# Patient Record
Sex: FEMALE | Race: WHITE | NOT HISPANIC OR LATINO
[De-identification: names, ages, dates, MRNs, and addresses within clinical notes are randomized per-mention and may not be internally consistent; named-entity substitution may affect disease eponyms.]

---

## 2023-12-29 ENCOUNTER — APPOINTMENT (OUTPATIENT)
Dept: OTOLARYNGOLOGY | Facility: CLINIC | Age: 28
End: 2023-12-29
Payer: COMMERCIAL

## 2023-12-29 VITALS — WEIGHT: 125 LBS | HEIGHT: 63 IN | BODY MASS INDEX: 22.15 KG/M2

## 2023-12-29 DIAGNOSIS — Z78.9 OTHER SPECIFIED HEALTH STATUS: ICD-10-CM

## 2023-12-29 DIAGNOSIS — Z82.0 FAMILY HISTORY OF EPILEPSY AND OTHER DISEASES OF THE NERVOUS SYSTEM: ICD-10-CM

## 2023-12-29 PROBLEM — Z00.00 ENCOUNTER FOR PREVENTIVE HEALTH EXAMINATION: Status: ACTIVE | Noted: 2023-12-29

## 2023-12-29 PROCEDURE — 99203 OFFICE O/P NEW LOW 30 MIN: CPT

## 2023-12-29 RX ORDER — BUPROPION HYDROCHLORIDE 300 MG/1
300 TABLET, EXTENDED RELEASE ORAL
Refills: 0 | Status: ACTIVE | COMMUNITY

## 2023-12-29 RX ORDER — DEXTROAMPHETAMINE SULFATE 10 MG/1
10 TABLET ORAL
Refills: 0 | Status: ACTIVE | COMMUNITY

## 2023-12-29 NOTE — PHYSICAL EXAM
[TextEntry] : PHYSICAL EXAM  General: The patient was alert, oriented and in no distress. Voice was clear.  Face: The patient had no facial asymmetry or mass. The skin was unremarkable.  Ears: Hearing normal to conversational voice External ears were normal without deformity. Ear canals were clear. No cerumen or inflammation Tympanic membranes were intact and normal. No perforation or effusion. mobile  Nose:  The external nose had no significant deformity.     On anterior rhinoscopy, the nasal mucosa was clear.   The anterior septum was grossly midline. There were no visualized polyps, purulence  or masses.   Oral cavity: Oral mucosa- normal. Oral and base of tongue- clear and without mass. Gingival and buccal mucosa- moist and without lesions. Palate- the palate moved well. There was no cleft palate. There appeared to be good salivary flow.   Oral cavity/oropharynx- no pus, erythema or mass 2-3+ cryptic tonsils.  Culture taken  Neck:  The neck was symmetrical. The parotid and submandibular glands were normal without masses. The trachea was midline and there was no unusual crepitus. Thyroid was smooth and nontender and no masses were palpated. No masses  Lymphatics: Cervical adenopathy- none.

## 2023-12-29 NOTE — HISTORY OF PRESENT ILLNESS
[de-identified] : Ayanna Herron is a 28 year old patient Here for evaluation for recurrent tonsillitis.  She said that since February, she has had 1-2 episodes a month where she developed sore throat and fever.  The fever could be as high as 103.  She may also occasionally have lesions or ulcers on her tonsils.  She has been to urgent care.  She said testing for the flu, COVID, and strep is typically negative.  She has been placed on antibiotics once or twice without much improvement.  She does not have a history of recurrent tonsillitis prior to this.  She does not have a known history of reflux.  She does not smoke tobacco but does smoke marijuana.  Her last episode was last month.

## 2023-12-29 NOTE — ASSESSMENT
[FreeTextEntry1] : She has a history of recurrent tonsillitis since last February.  Her last episode was last month.  Cultures have been negative.  On exam she does have 2-3+ tonsils.  A culture was taken.  Flexible laryngoscopy was unremarkable  Plan -Findings and management options were discussed with the patient. -Salt water gargles as needed -I have asked her to call for the culture results -We discussed management options.  Because she has not had positive cultures, I am recommending ID evaluation prior to considering tonsillectomy. -I would like to see her when she has an infection -I will see her back after the ID evaluation

## 2024-01-02 LAB — BACTERIA THROAT CULT: NORMAL

## 2024-01-30 ENCOUNTER — APPOINTMENT (OUTPATIENT)
Dept: OTOLARYNGOLOGY | Facility: CLINIC | Age: 29
End: 2024-01-30
Payer: COMMERCIAL

## 2024-01-30 DIAGNOSIS — R07.0 PAIN IN THROAT: ICD-10-CM

## 2024-01-30 PROCEDURE — 99213 OFFICE O/P EST LOW 20 MIN: CPT

## 2024-01-30 NOTE — HISTORY OF PRESENT ILLNESS
[de-identified] : Ayanna Herron is a 29 year old patient Here for possible tonsillitis.  She said she had a sore throat for 2 days.  Last night she had worsening pain and a fever to 102.  The fever has resolved.  She still has throat discomfort.  ENT history Since February 2023 she has had 1-2 episodes a month of sore throat and fever. She occasionally has ulcers or lesions on her tonsils Testing for strep is typically negative Antibiotics did not help No history of reflux She smokes marijuana She does not smoke tobacco Flexible laryngoscopy was unremarkable 12/29/23-culture negative

## 2024-01-30 NOTE — ASSESSMENT
[FreeTextEntry1] : She has a history of recurrent tonsillitis/sore throats.  She developed symptoms 2 days ago.  She had very mild erythema on exam today.  There is no abscess or lesions.  A culture was taken to rule out bacterial infection  Plan -Findings and management options were discussed with the patient. -Continue salt water gargles -I asked to call for the culture results.  I will place him on antibiotics-if the culture is positive. -We had discussed ID evaluation prior to considering tonsillectomy.  I recommended she proceed with it -Smoking cessation recommended -I will see how she is feeling when she calls for the culture results.  Otherwise, I will see her back after the ID evaluation

## 2024-02-02 LAB — BACTERIA THROAT CULT: NORMAL

## 2024-04-02 ENCOUNTER — APPOINTMENT (OUTPATIENT)
Dept: INTERNAL MEDICINE | Facility: CLINIC | Age: 29
End: 2024-04-02
Payer: COMMERCIAL

## 2024-04-02 VITALS
WEIGHT: 132.13 LBS | DIASTOLIC BLOOD PRESSURE: 79 MMHG | HEART RATE: 115 BPM | SYSTOLIC BLOOD PRESSURE: 124 MMHG | OXYGEN SATURATION: 99 % | TEMPERATURE: 98 F | BODY MASS INDEX: 23.41 KG/M2 | HEIGHT: 63 IN

## 2024-04-02 DIAGNOSIS — F32.A DEPRESSION, UNSPECIFIED: ICD-10-CM

## 2024-04-02 DIAGNOSIS — Z80.3 FAMILY HISTORY OF MALIGNANT NEOPLASM OF BREAST: ICD-10-CM

## 2024-04-02 DIAGNOSIS — Z82.49 FAMILY HISTORY OF ISCHEMIC HEART DISEASE AND OTHER DISEASES OF THE CIRCULATORY SYSTEM: ICD-10-CM

## 2024-04-02 DIAGNOSIS — F90.9 ATTENTION-DEFICIT HYPERACTIVITY DISORDER, UNSPECIFIED TYPE: ICD-10-CM

## 2024-04-02 PROCEDURE — G2211 COMPLEX E/M VISIT ADD ON: CPT

## 2024-04-02 PROCEDURE — 99204 OFFICE O/P NEW MOD 45 MIN: CPT

## 2024-04-02 PROCEDURE — 36415 COLL VENOUS BLD VENIPUNCTURE: CPT

## 2024-04-02 PROCEDURE — G0444 DEPRESSION SCREEN ANNUAL: CPT | Mod: 59

## 2024-04-02 NOTE — HEALTH RISK ASSESSMENT
[2 - 4 times a month (2 pts)] : 2-4 times a month (2 points) [1 or 2 (0 pts)] : 1 or 2 (0 points) [Never (0 pts)] : Never (0 points) [Yes] : In the past 12 months have you used drugs other than those required for medical reasons? Yes [0] : 1) Little interest or pleasure doing things: Not at all (0) [PHQ-2 Negative - No further assessment needed] : PHQ-2 Negative - No further assessment needed [# of Members in Household ___] :  household currently consist of [unfilled] member(s) [Patient reported PAP Smear was normal] : Patient reported PAP Smear was normal [Employed] : employed [College] : College [Single] : single [Sexually Active] : sexually active [Fully functional (bathing, dressing, toileting, transferring, walking, feeding)] : Fully functional (bathing, dressing, toileting, transferring, walking, feeding) [Fully functional (using the telephone, shopping, preparing meals, housekeeping, doing laundry, using] : Fully functional and needs no help or supervision to perform IADLs (using the telephone, shopping, preparing meals, housekeeping, doing laundry, using transportation, managing medications and managing finances) [Never] : Never [de-identified] : marijuana [de-identified] : active [[id number]] : 0 [High Risk Behavior] : no high risk behavior [Reports changes in hearing] : Reports no changes in hearing [Reports changes in vision] : Reports no changes in vision [PapSmearDate] : 2022

## 2024-04-02 NOTE — PHYSICAL EXAM
[No Acute Distress] : no acute distress [Well Nourished] : well nourished [Well Developed] : well developed [Normal Sclera/Conjunctiva] : normal sclera/conjunctiva [Well-Appearing] : well-appearing [EOMI] : extraocular movements intact [PERRL] : pupils equal round and reactive to light [Normal Outer Ear/Nose] : the outer ears and nose were normal in appearance [No JVD] : no jugular venous distention [No Lymphadenopathy] : no lymphadenopathy [Supple] : supple [Thyroid Normal, No Nodules] : the thyroid was normal and there were no nodules present [No Respiratory Distress] : no respiratory distress  [No Accessory Muscle Use] : no accessory muscle use [Clear to Auscultation] : lungs were clear to auscultation bilaterally [Normal Rate] : normal rate  [Regular Rhythm] : with a regular rhythm [Normal S1, S2] : normal S1 and S2 [No Murmur] : no murmur heard [Non Tender] : non-tender [Soft] : abdomen soft [Non-distended] : non-distended [No Masses] : no abdominal mass palpated [No HSM] : no HSM [Normal Bowel Sounds] : normal bowel sounds [Normal Posterior Cervical Nodes] : no posterior cervical lymphadenopathy [Normal Anterior Cervical Nodes] : no anterior cervical lymphadenopathy [No CVA Tenderness] : no CVA  tenderness [No Spinal Tenderness] : no spinal tenderness [No Joint Swelling] : no joint swelling [Grossly Normal Strength/Tone] : grossly normal strength/tone [Coordination Grossly Intact] : coordination grossly intact [No Rash] : no rash [No Focal Deficits] : no focal deficits [Normal Gait] : normal gait [Normal Affect] : the affect was normal [Alert and Oriented x3] : oriented to person, place, and time [Normal Insight/Judgement] : insight and judgment were intact

## 2024-04-02 NOTE — HISTORY OF PRESENT ILLNESS
[FreeTextEntry1] : 29-year-old female with a past medical history of depression, ADHD, recurrent tonsillitis comes to the clinic to establish care.  She complains of sore throat, mild cough, fever (Tmax - 103), chills, body aches, muffled voice, dysphagia, odynophagia for the last few days.  Rapid strep throat in the clinic was negative.  On examination, there is obvious peritonsillar inflammation and swelling with prominent bilateral tonsillar exudate present.  She is vaccinated for COVID and flu.

## 2024-04-02 NOTE — REVIEW OF SYSTEMS
labs/studies  3/8 ekg reviewed personally by me  wbc 14.7, bun 27, creat 0.69  ua neg  rvp neg  cxr new multifocal opacities [Sore Throat] : sore throat [Negative] : Heme/Lymph

## 2024-04-03 ENCOUNTER — NON-APPOINTMENT (OUTPATIENT)
Age: 29
End: 2024-04-03

## 2024-04-03 LAB
ALBUMIN SERPL ELPH-MCNC: 4.2 G/DL
ALP BLD-CCNC: 91 U/L
ALT SERPL-CCNC: 38 U/L
ANION GAP SERPL CALC-SCNC: 13 MMOL/L
AST SERPL-CCNC: 26 U/L
BASOPHILS # BLD AUTO: 0.03 K/UL
BASOPHILS NFR BLD AUTO: 0.4 %
BILIRUB SERPL-MCNC: 0.2 MG/DL
BUN SERPL-MCNC: 6 MG/DL
CALCIUM SERPL-MCNC: 8.9 MG/DL
CHLORIDE SERPL-SCNC: 103 MMOL/L
CHOLEST SERPL-MCNC: 164 MG/DL
CO2 SERPL-SCNC: 22 MMOL/L
CREAT SERPL-MCNC: 0.71 MG/DL
EGFR: 118 ML/MIN/1.73M2
EOSINOPHIL # BLD AUTO: 0.03 K/UL
EOSINOPHIL NFR BLD AUTO: 0.4 %
ESTIMATED AVERAGE GLUCOSE: 97 MG/DL
GLUCOSE SERPL-MCNC: 99 MG/DL
HBA1C MFR BLD HPLC: 5 %
HCT VFR BLD CALC: 41.2 %
HDLC SERPL-MCNC: 57 MG/DL
HGB BLD-MCNC: 13.3 G/DL
IMM GRANULOCYTES NFR BLD AUTO: 0.4 %
LDLC SERPL CALC-MCNC: 88 MG/DL
LYMPHOCYTES # BLD AUTO: 1.28 K/UL
LYMPHOCYTES NFR BLD AUTO: 15 %
MAN DIFF?: NORMAL
MCHC RBC-ENTMCNC: 30.3 PG
MCHC RBC-ENTMCNC: 32.3 GM/DL
MCV RBC AUTO: 93.8 FL
MONOCYTES # BLD AUTO: 0.83 K/UL
MONOCYTES NFR BLD AUTO: 9.7 %
NEUTROPHILS # BLD AUTO: 6.33 K/UL
NEUTROPHILS NFR BLD AUTO: 74.1 %
NONHDLC SERPL-MCNC: 107 MG/DL
PLATELET # BLD AUTO: 258 K/UL
POTASSIUM SERPL-SCNC: 4.4 MMOL/L
PROT SERPL-MCNC: 7.5 G/DL
RBC # BLD: 4.39 M/UL
RBC # FLD: 13 %
SODIUM SERPL-SCNC: 139 MMOL/L
TRIGL SERPL-MCNC: 105 MG/DL
WBC # FLD AUTO: 8.53 K/UL

## 2024-04-18 ENCOUNTER — APPOINTMENT (OUTPATIENT)
Dept: INFECTIOUS DISEASE | Facility: CLINIC | Age: 29
End: 2024-04-18
Payer: COMMERCIAL

## 2024-04-18 VITALS
HEART RATE: 84 BPM | WEIGHT: 135 LBS | SYSTOLIC BLOOD PRESSURE: 124 MMHG | DIASTOLIC BLOOD PRESSURE: 76 MMHG | BODY MASS INDEX: 23.92 KG/M2 | HEIGHT: 63 IN | TEMPERATURE: 97.9 F | OXYGEN SATURATION: 98 %

## 2024-04-18 DIAGNOSIS — Z83.49 FAMILY HISTORY OF OTHER ENDOCRINE, NUTRITIONAL AND METABOLIC DISEASES: ICD-10-CM

## 2024-04-18 DIAGNOSIS — F12.91 CANNABIS USE, UNSPECIFIED, IN REMISSION: ICD-10-CM

## 2024-04-18 PROCEDURE — 99204 OFFICE O/P NEW MOD 45 MIN: CPT

## 2024-04-18 RX ORDER — AMOXICILLIN AND CLAVULANATE POTASSIUM 875; 125 MG/1; MG/1
875-125 TABLET, COATED ORAL
Qty: 30 | Refills: 0 | Status: COMPLETED | COMMUNITY
Start: 2024-04-02 | End: 2024-04-18

## 2024-04-18 NOTE — DATA REVIEWED
[FreeTextEntry1] : 4/2/24 CBC normal, CMP unremarkable   1/30/24 Throat culture: neg 12/29/23 Throat culture: neg

## 2024-04-18 NOTE — PHYSICAL EXAM
[General Appearance - Alert] : alert [General Appearance - In No Acute Distress] : in no acute distress [Sclera] : the sclera and conjunctiva were normal [Outer Ear] : the ears and nose were normal in appearance [Oropharynx] : the oropharynx was normal with no thrush [Neck Appearance] : the appearance of the neck was normal [] : no respiratory distress [Respiration, Rhythm And Depth] : normal respiratory rhythm and effort [Auscultation Breath Sounds / Voice Sounds] : lungs were clear to auscultation bilaterally [Heart Rate And Rhythm] : heart rate was normal and rhythm regular [Heart Sounds] : normal S1 and S2 [Bowel Sounds] : normal bowel sounds [Abdomen Soft] : soft [Abdomen Tenderness] : non-tender

## 2024-04-18 NOTE — ASSESSMENT
[FreeTextEntry1] : 28yo healthy F p/w recurrent tonsilitis.     Given the fact that she has tested negative for GAS multiple times and no benefit from abx, this is likely recurrent viral tonsilitis.  Not concerning for immune system since she doesn't get other infection such as PNA or sinusitis, and her CBC is normal.  I think she fulfills the criteria for tonsillectomy ([place] criteria).  I will check GC/chlamydia throat in case but I doubt she has it.    - GC/chlamydia throat  - f/u Dr. Boston - RTC prn

## 2024-04-18 NOTE — HISTORY OF PRESENT ILLNESS
[Sexually Active] : The patient is sexually active [Monogamous] : monogamous [Men] : with men [FreeTextEntry1] : 30yo healthy F p/w recurrent tonsilitis.   She was referred to me by Dr. Boston.  She started to have recurrent tonsilitis in 2/2023.  When he gets tonsilitis, she gets high fever, chills, bodyache, sore throat with inflamed tonsils.  Each time when she gets it, throat sab is negative for GAS, covid, flu.   Usually she is sick for 3 days, then 4th day her fever resolves, and sore throat resolves in a week later.  Sometimes she goes to urgent care and gets abx, and sometimes no abx given and is told that it is due to viral cause.  She recovers in a week regardless of abx.  No other symptoms.  She lives with two roommates, and none of them have the same symptoms.  She gets this recurrent tonsilitis episode every month or every other months.  She saw Dr. Boston (ENT) on 12/30 and 1/30 for this issue, and she was referred to ID for evaluation before proceeding to tonsillectomy.  Currently she is asymptomatic.  Last symptom was 2 weeks ago.  She was on amoxicillin x 2 weeks without any help.   [de-identified] : Never, last screening summer 2022  [de-identified] : Born in [place], grew up in [place], moved to [place] at 18 [de-identified] : preparing for grad school  [de-identified] : single  [de-identified] : 2 roommates and a cat

## 2024-04-22 LAB
C TRACH RRNA SPEC QL NAA+PROBE: NOT DETECTED
N GONORRHOEA RRNA SPEC QL NAA+PROBE: NOT DETECTED
SOURCE ORAL: NORMAL

## 2024-05-07 ENCOUNTER — APPOINTMENT (OUTPATIENT)
Dept: OTOLARYNGOLOGY | Facility: CLINIC | Age: 29
End: 2024-05-07
Payer: COMMERCIAL

## 2024-05-07 PROCEDURE — 99213 OFFICE O/P EST LOW 20 MIN: CPT

## 2024-05-07 NOTE — PHYSICAL EXAM
[TextEntry] : General: The patient was alert, oriented and in no distress. Voice was clear.  Face: The patient had no facial asymmetry or mass. The skin was unremarkable.  Ears: Hearing normal to conversational voice External ears were normal without deformity. Ear canals were clear. No cerumen or inflammation Tympanic membranes were intact and normal. No perforation or effusion. mobile  Nose: The external nose had no significant deformity. On anterior rhinoscopy, the nasal mucosa was clear. The anterior septum was grossly midline. There were no visualized polyps, purulence or masses.  Oral cavity: Oral mucosa- normal. Oral and base of tongue- clear and without mass. Gingival and buccal mucosa- moist and without lesions. Palate- the palate moved well. There was no cleft palate. There appeared to be good salivary flow. Oral cavity/oropharynx-2-3+ tonsils.  No erythema, exudate, or abscess  Neck: The neck was symmetrical. The parotid and submandibular glands were normal without masses. The trachea was midline and there was no unusual crepitus. Thyroid was smooth and nontender and no masses were palpated. No masses  Lymphatics: Cervical adenopathy- none.

## 2024-05-07 NOTE — ASSESSMENT
[FreeTextEntry1] : She has a history of recurrent tonsillitis.  She gets an infection every 1 to 2 months.  She saw ID who felt that she would benefit from tonsillectomy  Plan -Findings and management options were discussed with the patient. - Continue salt water gargles as needed - She should avoid smoking any substances - The treatment options were reviewed with the patient- observation with continued medical therapy and surgery.  We discussed whether or not she meets the criteria for tonsillectomy.  She does get infections every 1 to 2 months and has since February 2023.  She would likely benefit from tonsillectomy I reviewed the procedure and the risks/benefits of the options. The risks of surgery were discussed in detail and include but are not limited  to anesthesia complications, bleeding, infection, worsening or persistent symptoms, voice change, trouble swallowing, unforeseen catastrophic consequences such as death.  She does understand that it is possible she may have persistent symptoms.  We discussed partial versus complete tonsillectomy.  I performed complete tonsillectomy.  I am going to have her speak with my surgery scheduler about arranging the surgery.  I have also given her the name of 1 my colleagues who performs tonsillectomies if she wishes to schedule the surgery earlier than I can arrange. - She should call and return urgently if any problems or worsening symptoms

## 2024-05-07 NOTE — HISTORY OF PRESENT ILLNESS
[de-identified] : Ayanna Herron is a 29 year old patient Here for follow-up for recurrent tonsillitis/sore throats.  She said that she had another episode of tonsillitis in early April.  She took antibiotics although it did not help much.  She feels like she is getting an infection every 1 to 2 months since February 2023.  Culture taken at her last visit was negative.  She saw Dr. Martin for ID evaluation.  She felt there was no concern for immunodeficiency.  She does feel that the patient would benefit from tonsillectomy   ENT history Since February 2023 she has had 1-2 episodes a month of sore throat and fever. She occasionally has ulcers or lesions on her tonsils Testing for strep is typically negative Antibiotics did not help No history of reflux She smokes marijuana She does not smoke tobacco Flexible laryngoscopy was unremarkable 12/29/23, January 2024-culture negative

## 2024-06-25 ENCOUNTER — APPOINTMENT (OUTPATIENT)
Dept: OTOLARYNGOLOGY | Facility: CLINIC | Age: 29
End: 2024-06-25
Payer: COMMERCIAL

## 2024-06-25 VITALS
TEMPERATURE: 98.7 F | HEART RATE: 88 BPM | BODY MASS INDEX: 23.92 KG/M2 | HEIGHT: 63 IN | DIASTOLIC BLOOD PRESSURE: 69 MMHG | WEIGHT: 135 LBS | OXYGEN SATURATION: 99 % | SYSTOLIC BLOOD PRESSURE: 109 MMHG

## 2024-06-25 DIAGNOSIS — R06.83 SNORING: ICD-10-CM

## 2024-06-25 DIAGNOSIS — J03.91 ACUTE RECURRENT TONSILLITIS, UNSPECIFIED: ICD-10-CM

## 2024-06-25 PROCEDURE — 31575 DIAGNOSTIC LARYNGOSCOPY: CPT

## 2024-06-25 PROCEDURE — 99214 OFFICE O/P EST MOD 30 MIN: CPT | Mod: 25

## 2024-08-08 ENCOUNTER — NON-APPOINTMENT (OUTPATIENT)
Age: 29
End: 2024-08-08

## 2024-08-08 ENCOUNTER — APPOINTMENT (OUTPATIENT)
Dept: FAMILY MEDICINE | Facility: CLINIC | Age: 29
End: 2024-08-08

## 2024-08-08 PROBLEM — Z01.818 PREOP EXAMINATION: Status: ACTIVE | Noted: 2024-08-08

## 2024-08-08 PROCEDURE — 93000 ELECTROCARDIOGRAM COMPLETE: CPT

## 2024-08-08 PROCEDURE — 36415 COLL VENOUS BLD VENIPUNCTURE: CPT

## 2024-08-08 PROCEDURE — 99204 OFFICE O/P NEW MOD 45 MIN: CPT | Mod: 25

## 2024-08-08 NOTE — ASSESSMENT
[Patient Requires Further Testing] : Patient requires further testing [ECG] : ECG [Other: _____] : [unfilled] [Continue medications as is] : Continue current medications [As per surgery] : as per surgery

## 2024-08-09 ENCOUNTER — APPOINTMENT (OUTPATIENT)
Dept: OTOLARYNGOLOGY | Facility: CLINIC | Age: 29
End: 2024-08-09

## 2024-08-09 PROCEDURE — 99215 OFFICE O/P EST HI 40 MIN: CPT

## 2024-08-09 PROCEDURE — G2211 COMPLEX E/M VISIT ADD ON: CPT | Mod: NC

## 2024-08-09 NOTE — HISTORY OF PRESENT ILLNESS
[de-identified] : Since 2/23 she'd had monthly episodes of very sore throat and fever, chills, aches and tonsil swelling with white patches.  No significant hoarseness, reflux or dysphagia at baseline. Now preop for tonsillectomy Snoring but no witnessed apneas or daytime sleepiness.

## 2024-08-09 NOTE — END OF VISIT
[Time Spent: ___ minutes] : I have spent [unfilled] minutes of time on the encounter. [FreeTextEntry3] : Total time spent caring for the patient today was 31 minutes. This includes time spent before the visit reviewing the chart, time spent during the visit, and time spent after the visit on documentation.

## 2024-08-09 NOTE — ADDENDUM
[FreeTextEntry1] : Bloodwork acceptable for surgery. Patient optimized from medical standpoint for planned procedure with routine hemodynamic monitoring.

## 2024-08-09 NOTE — HISTORY OF PRESENT ILLNESS
[(Patient denies any chest pain, claudication, dyspnea on exertion, orthopnea, palpitations or syncope)] : Patient denies any chest pain, claudication, dyspnea on exertion, orthopnea, palpitations or syncope [Good (7-10 METs)] : Good (7-10 METs) [Aortic Stenosis] : no aortic stenosis [Atrial Fibrillation] : no atrial fibrillation [Coronary Artery Disease] : no coronary artery disease [Recent Myocardial Infarction] : no recent myocardial infarction [Implantable Device/Pacemaker] : no implantable device/pacemaker [Asthma] : no asthma [COPD] : no COPD [Sleep Apnea] : no sleep apnea [Smoker] : not a smoker [Family Member] : no family member with adverse anesthesia reaction/sudden death [Self] : no previous adverse anesthesia reaction [Chronic Anticoagulation] : no chronic anticoagulation [Chronic Kidney Disease] : no chronic kidney disease [Diabetes] : no diabetes [FreeTextEntry1] : tonsillectomy [FreeTextEntry2] : 8/21/24 [FreeTextEntry3] : Dr. Ordonez [FreeTextEntry4] : 28 yo female presents for preop exam. Patient denies prior hx of ACS, CVA/TIA, CHF, current insulin use. Denies prior anesthesia reaction. Denies family history of anesthesia reaction. Can perform >4 METs without CP, SOB. Feeling well today.

## 2024-08-09 NOTE — PLAN
[FreeTextEntry1] : EKG NSR, no acute ST/T wave changes, acceptable for surgery. RCRI score 0, patient low risk candidate for low risk procedure Patient optimized for planned procedure pending bloodwork

## 2024-08-09 NOTE — ASSESSMENT
[FreeTextEntry1] : Preop done for tonsillectomy. The risks and benefits were fully reviewed including but not limited to: postoperative hemorrhage; velopharyngeal insufficiency; swallowing or speaking problems; asymmetric appearing palate. The patient would like to proceed. An educational perioperative care handout was given. [facility] Reference #: [id number]

## 2024-08-09 NOTE — REASON FOR VISIT
[Home] : at home, [unfilled] , at the time of the visit. [Other Location: e.g. Home (Enter Location, City,State)___] : at [unfilled] [Patient] : the patient [Pre-Surgical Visit: ____] : pre-surgical visit for [unfilled]

## 2024-08-21 ENCOUNTER — APPOINTMENT (OUTPATIENT)
Age: 29
End: 2024-08-21
Payer: COMMERCIAL

## 2024-08-21 ENCOUNTER — RESULT REVIEW (OUTPATIENT)
Age: 29
End: 2024-08-21

## 2024-08-21 PROCEDURE — 42826 REMOVAL OF TONSILS: CPT

## 2024-09-10 ENCOUNTER — TRANSCRIPTION ENCOUNTER (OUTPATIENT)
Age: 29
End: 2024-09-10

## 2024-09-23 ENCOUNTER — APPOINTMENT (OUTPATIENT)
Dept: OTOLARYNGOLOGY | Facility: CLINIC | Age: 29
End: 2024-09-23
Payer: COMMERCIAL

## 2024-09-23 VITALS
SYSTOLIC BLOOD PRESSURE: 114 MMHG | BODY MASS INDEX: 23.04 KG/M2 | HEART RATE: 90 BPM | DIASTOLIC BLOOD PRESSURE: 72 MMHG | TEMPERATURE: 98 F | WEIGHT: 130 LBS | HEIGHT: 63 IN | OXYGEN SATURATION: 98 %

## 2024-09-23 DIAGNOSIS — R07.0 PAIN IN THROAT: ICD-10-CM

## 2024-09-23 DIAGNOSIS — Z87.09 PERSONAL HISTORY OF OTHER DISEASES OF THE RESPIRATORY SYSTEM: ICD-10-CM

## 2024-09-23 DIAGNOSIS — Z87.898 PERSONAL HISTORY OF OTHER SPECIFIED CONDITIONS: ICD-10-CM

## 2024-09-23 PROCEDURE — 99024 POSTOP FOLLOW-UP VISIT: CPT

## 2024-09-23 NOTE — PHYSICAL EXAM
[Normal] : normal appearance, well groomed, well nourished, and in no acute distress [Removed] : palatine tonsils previously removed

## 2024-10-16 ENCOUNTER — APPOINTMENT (OUTPATIENT)
Dept: DERMATOLOGY | Facility: CLINIC | Age: 29
End: 2024-10-16